# Patient Record
Sex: FEMALE | Race: WHITE | Employment: FULL TIME | URBAN - METROPOLITAN AREA
[De-identification: names, ages, dates, MRNs, and addresses within clinical notes are randomized per-mention and may not be internally consistent; named-entity substitution may affect disease eponyms.]

---

## 2017-01-01 ENCOUNTER — TELEPHONE (OUTPATIENT)
Dept: FAMILY MEDICINE CLINIC | Facility: CLINIC | Age: 47
End: 2017-01-01

## 2017-01-01 ENCOUNTER — APPOINTMENT (OUTPATIENT)
Dept: CT IMAGING | Facility: HOSPITAL | Age: 47
DRG: 055 | End: 2017-01-01
Attending: INTERNAL MEDICINE
Payer: MEDICAID

## 2017-01-01 ENCOUNTER — OFFICE VISIT (OUTPATIENT)
Dept: RADIATION ONCOLOGY | Facility: HOSPITAL | Age: 47
DRG: 055 | End: 2017-01-01
Attending: Other
Payer: MEDICAID

## 2017-01-01 ENCOUNTER — APPOINTMENT (OUTPATIENT)
Dept: MRI IMAGING | Facility: HOSPITAL | Age: 47
DRG: 055 | End: 2017-01-01
Attending: Other
Payer: MEDICAID

## 2017-01-01 ENCOUNTER — HOSPITAL ENCOUNTER (INPATIENT)
Facility: HOSPITAL | Age: 47
LOS: 5 days | Discharge: HOSPICE | DRG: 055 | End: 2017-01-01
Attending: EMERGENCY MEDICINE
Payer: MEDICAID

## 2017-01-01 ENCOUNTER — HOSPITAL ENCOUNTER (OUTPATIENT)
Dept: CT IMAGING | Age: 47
Discharge: HOME | DRG: 055 | End: 2017-01-01
Payer: MEDICAID

## 2017-01-01 ENCOUNTER — OFFICE VISIT (OUTPATIENT)
Dept: FAMILY MEDICINE CLINIC | Facility: CLINIC | Age: 47
End: 2017-01-01

## 2017-01-01 ENCOUNTER — APPOINTMENT (OUTPATIENT)
Dept: CT IMAGING | Facility: HOSPITAL | Age: 47
DRG: 055 | End: 2017-01-01
Attending: EMERGENCY MEDICINE
Payer: MEDICAID

## 2017-01-01 ENCOUNTER — HOSPITAL ENCOUNTER (EMERGENCY)
Facility: HOSPITAL | Age: 47
Discharge: HOME | End: 2017-01-01
Attending: EMERGENCY MEDICINE
Payer: MEDICAID

## 2017-01-01 ENCOUNTER — HOSPITAL ENCOUNTER (INPATIENT)
Facility: HOSPITAL | Age: 47
LOS: 3 days | DRG: 055 | End: 2017-01-01
Payer: OTHER MISCELLANEOUS

## 2017-01-01 VITALS
SYSTOLIC BLOOD PRESSURE: 114 MMHG | WEIGHT: 174 LBS | HEIGHT: 62.5 IN | OXYGEN SATURATION: 98 % | BODY MASS INDEX: 31.22 KG/M2 | HEART RATE: 73 BPM | DIASTOLIC BLOOD PRESSURE: 70 MMHG

## 2017-01-01 VITALS
SYSTOLIC BLOOD PRESSURE: 155 MMHG | HEART RATE: 77 BPM | OXYGEN SATURATION: 87 % | BODY MASS INDEX: 31.28 KG/M2 | DIASTOLIC BLOOD PRESSURE: 76 MMHG | HEIGHT: 62 IN | TEMPERATURE: 100 F | WEIGHT: 170 LBS | RESPIRATION RATE: 28 BRPM

## 2017-01-01 VITALS
BODY MASS INDEX: 30.91 KG/M2 | OXYGEN SATURATION: 99 % | HEART RATE: 85 BPM | TEMPERATURE: 98 F | HEIGHT: 62 IN | WEIGHT: 168 LBS | DIASTOLIC BLOOD PRESSURE: 84 MMHG | SYSTOLIC BLOOD PRESSURE: 150 MMHG | RESPIRATION RATE: 18 BRPM

## 2017-01-01 VITALS
RESPIRATION RATE: 28 BRPM | HEART RATE: 103 BPM | OXYGEN SATURATION: 72 % | SYSTOLIC BLOOD PRESSURE: 174 MMHG | DIASTOLIC BLOOD PRESSURE: 72 MMHG | TEMPERATURE: 102 F

## 2017-01-01 VITALS — DIASTOLIC BLOOD PRESSURE: 80 MMHG | HEART RATE: 70 BPM | OXYGEN SATURATION: 98 % | SYSTOLIC BLOOD PRESSURE: 120 MMHG

## 2017-01-01 DIAGNOSIS — D31.32: ICD-10-CM

## 2017-01-01 DIAGNOSIS — D31.32: Primary | ICD-10-CM

## 2017-01-01 DIAGNOSIS — C79.31 METASTATIC CANCER TO BRAIN (HCC): Primary | ICD-10-CM

## 2017-01-01 DIAGNOSIS — R42 DIZZINESS: Primary | ICD-10-CM

## 2017-01-01 DIAGNOSIS — J06.9 VIRAL UPPER RESPIRATORY TRACT INFECTION: Primary | ICD-10-CM

## 2017-01-01 DIAGNOSIS — M25.512 ACUTE PAIN OF LEFT SHOULDER: ICD-10-CM

## 2017-01-01 LAB
ANION GAP SERPL CALC-SCNC: 7 MMOL/L (ref 0–18)
BASOPHILS # BLD: 0 K/UL (ref 0–0.2)
BASOPHILS NFR BLD: 0 %
BILIRUB UR QL: NEGATIVE
BUN SERPL-MCNC: 5 MG/DL (ref 8–20)
BUN/CREAT SERPL: 6.4 (ref 10–20)
CALCIUM SERPL-MCNC: 9.4 MG/DL (ref 8.5–10.5)
CHLORIDE SERPL-SCNC: 107 MMOL/L (ref 95–110)
CLARITY UR: CLEAR
CO2 SERPL-SCNC: 24 MMOL/L (ref 22–32)
COLOR UR: YELLOW
CREAT SERPL-MCNC: 0.78 MG/DL (ref 0.5–1.5)
EOSINOPHIL # BLD: 0.1 K/UL (ref 0–0.7)
EOSINOPHIL NFR BLD: 1 %
ERYTHROCYTE [DISTWIDTH] IN BLOOD BY AUTOMATED COUNT: 14.9 % (ref 11–15)
GLUCOSE SERPL-MCNC: 139 MG/DL (ref 70–99)
GLUCOSE UR-MCNC: 150 MG/DL
HCT VFR BLD AUTO: 36.1 % (ref 35–48)
HGB BLD-MCNC: 11.6 G/DL (ref 12–16)
KETONES UR-MCNC: NEGATIVE MG/DL
LEUKOCYTE ESTERASE UR QL STRIP.AUTO: NEGATIVE
LYMPHOCYTES # BLD: 1.9 K/UL (ref 1–4)
LYMPHOCYTES NFR BLD: 25 %
MCH RBC QN AUTO: 24.1 PG (ref 27–32)
MCHC RBC AUTO-ENTMCNC: 32.2 G/DL (ref 32–37)
MCV RBC AUTO: 74.9 FL (ref 80–100)
MONOCYTES # BLD: 0.5 K/UL (ref 0–1)
MONOCYTES NFR BLD: 7 %
NEUTROPHILS # BLD AUTO: 5 K/UL (ref 1.8–7.7)
NEUTROPHILS NFR BLD: 67 %
NITRITE UR QL STRIP.AUTO: NEGATIVE
OSMOLALITY UR CALC.SUM OF ELEC: 286 MOSM/KG (ref 275–295)
PH UR: 5 [PH] (ref 5–8)
PLATELET # BLD AUTO: 258 K/UL (ref 140–400)
PMV BLD AUTO: 8.5 FL (ref 7.4–10.3)
POTASSIUM SERPL-SCNC: 3.8 MMOL/L (ref 3.3–5.1)
PROT UR-MCNC: NEGATIVE MG/DL
RBC # BLD AUTO: 4.82 M/UL (ref 3.7–5.4)
RBC #/AREA URNS AUTO: 58 /HPF
SODIUM SERPL-SCNC: 138 MMOL/L (ref 136–144)
SP GR UR STRIP: 1.01 (ref 1–1.03)
UROBILINOGEN UR STRIP-ACNC: <2
VIT C UR-MCNC: NEGATIVE MG/DL
WBC # BLD AUTO: 7.5 K/UL (ref 4–11)
WBC #/AREA URNS AUTO: 1 /HPF

## 2017-01-01 PROCEDURE — 70553 MRI BRAIN STEM W/O & W/DYE: CPT

## 2017-01-01 PROCEDURE — 99253 IP/OBS CNSLTJ NEW/EST LOW 45: CPT | Performed by: OTHER

## 2017-01-01 PROCEDURE — 99285 EMERGENCY DEPT VISIT HI MDM: CPT

## 2017-01-01 PROCEDURE — 70450 CT HEAD/BRAIN W/O DYE: CPT

## 2017-01-01 PROCEDURE — 99233 SBSQ HOSP IP/OBS HIGH 50: CPT | Performed by: INTERNAL MEDICINE

## 2017-01-01 PROCEDURE — 99253 IP/OBS CNSLTJ NEW/EST LOW 45: CPT | Performed by: INTERNAL MEDICINE

## 2017-01-01 PROCEDURE — 99231 SBSQ HOSP IP/OBS SF/LOW 25: CPT | Performed by: INTERNAL MEDICINE

## 2017-01-01 PROCEDURE — 99232 SBSQ HOSP IP/OBS MODERATE 35: CPT | Performed by: INTERNAL MEDICINE

## 2017-01-01 PROCEDURE — 99232 SBSQ HOSP IP/OBS MODERATE 35: CPT | Performed by: FAMILY MEDICINE

## 2017-01-01 PROCEDURE — 81001 URINALYSIS AUTO W/SCOPE: CPT | Performed by: EMERGENCY MEDICINE

## 2017-01-01 PROCEDURE — 0T9B70Z DRAINAGE OF BLADDER WITH DRAINAGE DEVICE, VIA NATURAL OR ARTIFICIAL OPENING: ICD-10-PCS

## 2017-01-01 PROCEDURE — 71260 CT THORAX DX C+: CPT

## 2017-01-01 PROCEDURE — 99213 OFFICE O/P EST LOW 20 MIN: CPT

## 2017-01-01 PROCEDURE — 93010 ELECTROCARDIOGRAM REPORT: CPT | Performed by: EMERGENCY MEDICINE

## 2017-01-01 PROCEDURE — 36415 COLL VENOUS BLD VENIPUNCTURE: CPT

## 2017-01-01 PROCEDURE — 85025 COMPLETE CBC W/AUTO DIFF WBC: CPT | Performed by: EMERGENCY MEDICINE

## 2017-01-01 PROCEDURE — 93005 ELECTROCARDIOGRAM TRACING: CPT

## 2017-01-01 PROCEDURE — 99232 SBSQ HOSP IP/OBS MODERATE 35: CPT

## 2017-01-01 PROCEDURE — 99252 IP/OBS CONSLTJ NEW/EST SF 35: CPT | Performed by: INTERNAL MEDICINE

## 2017-01-01 PROCEDURE — 74177 CT ABD & PELVIS W/CONTRAST: CPT

## 2017-01-01 PROCEDURE — 99212 OFFICE O/P EST SF 10 MIN: CPT

## 2017-01-01 PROCEDURE — 80048 BASIC METABOLIC PNL TOTAL CA: CPT | Performed by: EMERGENCY MEDICINE

## 2017-01-01 PROCEDURE — 99222 1ST HOSP IP/OBS MODERATE 55: CPT

## 2017-01-01 RX ORDER — TRAMADOL HYDROCHLORIDE 50 MG/1
50 TABLET ORAL EVERY 6 HOURS PRN
Status: CANCELLED | OUTPATIENT
Start: 2017-01-01

## 2017-01-01 RX ORDER — NAPROXEN 500 MG/1
500 TABLET ORAL 2 TIMES DAILY WITH MEALS
Qty: 60 TABLET | Refills: 0 | Status: ON HOLD | OUTPATIENT
Start: 2017-01-01 | End: 2017-01-01

## 2017-01-01 RX ORDER — NICOTINE 21 MG/24HR
1 PATCH, TRANSDERMAL 24 HOURS TRANSDERMAL DAILY
Status: DISCONTINUED | OUTPATIENT
Start: 2017-01-01 | End: 2017-01-01

## 2017-01-01 RX ORDER — ATROPINE SULFATE 10 MG/ML
2 SOLUTION/ DROPS OPHTHALMIC EVERY 2 HOUR PRN
Status: DISCONTINUED | OUTPATIENT
Start: 2017-01-01 | End: 2017-01-01

## 2017-01-01 RX ORDER — MORPHINE SULFATE 2 MG/ML
2 INJECTION, SOLUTION INTRAMUSCULAR; INTRAVENOUS EVERY 2 HOUR PRN
Status: DISCONTINUED | OUTPATIENT
Start: 2017-01-01 | End: 2017-01-01

## 2017-01-01 RX ORDER — PROMETHAZINE HYDROCHLORIDE AND CODEINE PHOSPHATE 6.25; 1 MG/5ML; MG/5ML
5 SYRUP ORAL EVERY 6 HOURS PRN
Qty: 240 ML | Refills: 0 | Status: SHIPPED | OUTPATIENT
Start: 2017-01-01 | End: 2017-01-01

## 2017-01-01 RX ORDER — MORPHINE SULFATE 4 MG/ML
2 INJECTION, SOLUTION INTRAMUSCULAR; INTRAVENOUS EVERY 2 HOUR PRN
Status: DISCONTINUED | OUTPATIENT
Start: 2017-01-01 | End: 2017-01-01

## 2017-01-01 RX ORDER — DEXTROSE AND SODIUM CHLORIDE 5; .45 G/100ML; G/100ML
INJECTION, SOLUTION INTRAVENOUS CONTINUOUS
Status: DISCONTINUED | OUTPATIENT
Start: 2017-01-01 | End: 2017-01-01

## 2017-01-01 RX ORDER — 0.9 % SODIUM CHLORIDE 0.9 %
VIAL (ML) INJECTION
Status: COMPLETED
Start: 2017-01-01 | End: 2017-01-01

## 2017-01-01 RX ORDER — SODIUM CHLORIDE 9 MG/ML
INJECTION, SOLUTION INTRAVENOUS
Status: COMPLETED
Start: 2017-01-01 | End: 2017-01-01

## 2017-01-01 RX ORDER — TRAMADOL HYDROCHLORIDE 50 MG/1
50 TABLET ORAL EVERY 6 HOURS PRN
Status: DISCONTINUED | OUTPATIENT
Start: 2017-01-01 | End: 2017-01-01

## 2017-01-01 RX ORDER — DEXAMETHASONE SODIUM PHOSPHATE 4 MG/ML
4 VIAL (ML) INJECTION EVERY 6 HOURS
Status: DISCONTINUED | OUTPATIENT
Start: 2017-01-01 | End: 2017-01-01

## 2017-01-01 RX ORDER — SODIUM CHLORIDE 9 MG/ML
INJECTION, SOLUTION INTRAVENOUS
Status: DISPENSED
Start: 2017-01-01 | End: 2017-01-01

## 2017-01-01 RX ORDER — SODIUM CHLORIDE 0.9 % (FLUSH) 0.9 %
10 SYRINGE (ML) INJECTION AS NEEDED
Status: DISCONTINUED | OUTPATIENT
Start: 2017-01-01 | End: 2017-01-01

## 2017-01-01 RX ORDER — GLYCOPYRROLATE 0.2 MG/ML
0.4 INJECTION, SOLUTION INTRAMUSCULAR; INTRAVENOUS
Status: DISCONTINUED | OUTPATIENT
Start: 2017-01-01 | End: 2017-01-01

## 2017-01-01 RX ORDER — SENNA AND DOCUSATE SODIUM 50; 8.6 MG/1; MG/1
2 TABLET, FILM COATED ORAL DAILY
Status: CANCELLED | OUTPATIENT
Start: 2017-01-01

## 2017-01-01 RX ORDER — BISACODYL 10 MG
10 SUPPOSITORY, RECTAL RECTAL
Status: DISCONTINUED | OUTPATIENT
Start: 2017-01-01 | End: 2017-01-01

## 2017-01-01 RX ORDER — DEXAMETHASONE SODIUM PHOSPHATE 4 MG/ML
4 VIAL (ML) INJECTION EVERY 4 HOURS
Status: DISCONTINUED | OUTPATIENT
Start: 2017-01-01 | End: 2017-01-01

## 2017-01-01 RX ORDER — TRAMADOL HYDROCHLORIDE 50 MG/1
50 TABLET ORAL ONCE
Status: COMPLETED | OUTPATIENT
Start: 2017-01-01 | End: 2017-01-01

## 2017-01-01 RX ORDER — MORPHINE SULFATE IN 0.9 % NACL 1 MG/ML
1 PLASTIC BAG, INJECTION (ML) INTRAVENOUS CONTINUOUS
Status: CANCELLED | OUTPATIENT
Start: 2017-01-01

## 2017-01-01 RX ORDER — LORAZEPAM 2 MG/ML
2 INJECTION INTRAMUSCULAR EVERY 4 HOURS PRN
Status: DISCONTINUED | OUTPATIENT
Start: 2017-01-01 | End: 2017-01-01

## 2017-01-01 RX ORDER — MORPHINE SULFATE 2 MG/ML
1 INJECTION, SOLUTION INTRAMUSCULAR; INTRAVENOUS
Status: DISCONTINUED | OUTPATIENT
Start: 2017-01-01 | End: 2017-01-01

## 2017-01-01 RX ORDER — METHYLPREDNISOLONE 4 MG/1
TABLET ORAL
Qty: 1 KIT | Refills: 0 | Status: SHIPPED | OUTPATIENT
Start: 2017-01-01 | End: 2017-01-01 | Stop reason: ALTCHOICE

## 2017-01-01 RX ORDER — CODEINE PHOSPHATE AND GUAIFENESIN 10; 100 MG/5ML; MG/5ML
5 SOLUTION ORAL EVERY 6 HOURS PRN
Qty: 240 ML | Refills: 0 | Status: SHIPPED | OUTPATIENT
Start: 2017-01-01 | End: 2017-01-01 | Stop reason: ALTCHOICE

## 2017-01-01 RX ORDER — HYOSCYAMINE SULFATE 0.125 MG
0.12 TABLET,DISINTEGRATING ORAL 4 TIMES DAILY PRN
Status: DISCONTINUED | OUTPATIENT
Start: 2017-01-01 | End: 2017-01-01

## 2017-01-01 RX ORDER — MORPHINE SULFATE 2 MG/ML
INJECTION, SOLUTION INTRAMUSCULAR; INTRAVENOUS
Status: COMPLETED
Start: 2017-01-01 | End: 2017-01-01

## 2017-01-01 RX ORDER — MORPHINE SULFATE IN 0.9 % NACL 1 MG/ML
1 PLASTIC BAG, INJECTION (ML) INTRAVENOUS CONTINUOUS
Status: DISCONTINUED | OUTPATIENT
Start: 2017-01-01 | End: 2017-01-01

## 2017-01-01 RX ORDER — DEXTROSE AND SODIUM CHLORIDE 5; .45 G/100ML; G/100ML
INJECTION, SOLUTION INTRAVENOUS CONTINUOUS
Status: CANCELLED | OUTPATIENT
Start: 2017-01-01

## 2017-01-01 RX ORDER — HALOPERIDOL 5 MG/ML
1 INJECTION INTRAMUSCULAR
Status: DISCONTINUED | OUTPATIENT
Start: 2017-01-01 | End: 2017-01-01

## 2017-01-01 RX ORDER — DEXAMETHASONE SODIUM PHOSPHATE 4 MG/ML
4 VIAL (ML) INJECTION EVERY 12 HOURS SCHEDULED
Status: DISCONTINUED | OUTPATIENT
Start: 2017-01-01 | End: 2017-01-01

## 2017-01-01 RX ORDER — SCOLOPAMINE TRANSDERMAL SYSTEM 1 MG/1
1 PATCH, EXTENDED RELEASE TRANSDERMAL
Status: DISCONTINUED | OUTPATIENT
Start: 2017-01-01 | End: 2017-01-01

## 2017-01-01 RX ORDER — LORAZEPAM 2 MG/ML
0.5 INJECTION INTRAMUSCULAR EVERY 4 HOURS PRN
Status: DISCONTINUED | OUTPATIENT
Start: 2017-01-01 | End: 2017-01-01

## 2017-01-01 RX ORDER — DEXAMETHASONE SODIUM PHOSPHATE 4 MG/ML
10 VIAL (ML) INJECTION ONCE
Status: COMPLETED | OUTPATIENT
Start: 2017-01-01 | End: 2017-01-01

## 2017-01-01 RX ORDER — ONDANSETRON 4 MG/1
4 TABLET, ORALLY DISINTEGRATING ORAL EVERY 6 HOURS PRN
Status: DISCONTINUED | OUTPATIENT
Start: 2017-01-01 | End: 2017-01-01

## 2017-01-01 RX ORDER — SENNA AND DOCUSATE SODIUM 50; 8.6 MG/1; MG/1
2 TABLET, FILM COATED ORAL DAILY
Status: DISCONTINUED | OUTPATIENT
Start: 2017-01-01 | End: 2017-01-01

## 2017-01-01 RX ORDER — POLYVINYL ALCOHOL 14 MG/ML
2 SOLUTION/ DROPS OPHTHALMIC
Status: DISCONTINUED | OUTPATIENT
Start: 2017-01-01 | End: 2017-01-01

## 2017-01-01 RX ORDER — 0.9 % SODIUM CHLORIDE 0.9 %
VIAL (ML) INJECTION
Status: DISPENSED
Start: 2017-01-01 | End: 2017-01-01

## 2017-01-01 RX ORDER — ONDANSETRON 2 MG/ML
4 INJECTION INTRAMUSCULAR; INTRAVENOUS EVERY 6 HOURS PRN
Status: DISCONTINUED | OUTPATIENT
Start: 2017-01-01 | End: 2017-01-01

## 2017-01-01 RX ORDER — SODIUM CHLORIDE 9 MG/ML
INJECTION, SOLUTION INTRAVENOUS CONTINUOUS
Status: DISCONTINUED | OUTPATIENT
Start: 2017-01-01 | End: 2017-01-01

## 2017-01-01 RX ORDER — MORPHINE SULFATE 4 MG/ML
4 INJECTION, SOLUTION INTRAMUSCULAR; INTRAVENOUS ONCE
Status: COMPLETED | OUTPATIENT
Start: 2017-01-01 | End: 2017-01-01

## 2017-01-01 RX ORDER — DEXAMETHASONE SODIUM PHOSPHATE 4 MG/ML
4 VIAL (ML) INJECTION EVERY 4 HOURS
Status: CANCELLED | OUTPATIENT
Start: 2017-01-01

## 2017-01-01 RX ORDER — MECLIZINE HYDROCHLORIDE 25 MG/1
25 TABLET ORAL 3 TIMES DAILY PRN
Qty: 15 TABLET | Refills: 0 | Status: ON HOLD | OUTPATIENT
Start: 2017-01-01 | End: 2017-01-01

## 2017-01-01 RX ORDER — NICOTINE 21 MG/24HR
1 PATCH, TRANSDERMAL 24 HOURS TRANSDERMAL DAILY
Status: CANCELLED | OUTPATIENT
Start: 2017-01-01

## 2017-01-01 RX ORDER — MORPHINE SULFATE 2 MG/ML
2 INJECTION, SOLUTION INTRAMUSCULAR; INTRAVENOUS EVERY 2 HOUR PRN
Status: CANCELLED | OUTPATIENT
Start: 2017-01-01

## 2017-01-01 RX ORDER — LORAZEPAM 2 MG/ML
1 INJECTION INTRAMUSCULAR EVERY 4 HOURS PRN
Status: DISCONTINUED | OUTPATIENT
Start: 2017-01-01 | End: 2017-01-01

## 2017-01-01 RX ORDER — HALOPERIDOL 5 MG/ML
2 INJECTION INTRAMUSCULAR
Status: DISCONTINUED | OUTPATIENT
Start: 2017-01-01 | End: 2017-01-01

## 2017-02-21 PROBLEM — J06.9 VIRAL UPPER RESPIRATORY TRACT INFECTION: Status: ACTIVE | Noted: 2017-01-01

## 2017-02-21 PROBLEM — M25.512 ACUTE PAIN OF LEFT SHOULDER: Status: ACTIVE | Noted: 2017-01-01

## 2017-02-21 NOTE — PROGRESS NOTES
HPI:    Patient ID: Darryn Giudo is a 42year old female. Cough  This is a new problem. Episode onset: few weeks ago. The problem has been unchanged. The cough is productive of sputum.  Associated symptoms include chest pain, nasal congestion, rhinorrhe tingling and numbness. Negative for dizziness, syncope, light-headedness and headaches. All other systems reviewed and are negative. Current Outpatient Prescriptions:  methylPREDNISolone (MEDROL) 4 MG Oral Tablet Therapy Pack As directed.  Dis relief. Rx for Guaifenesin/Codeine given. 2. Acute pain of left shoulder  Clinical course, prognosis, and treatment options of disease process discussed with pt. Rx for Medrol dose pack and Naproxen given. RTC if symptoms worsen or fail to improve.

## 2017-02-22 NOTE — PATIENT INSTRUCTIONS
Shoulder Pain with Uncertain Cause  Shoulder pain can have many causes. Pain often comes from the structures that surround the shoulder joint. These are the joint capsule, ligaments, tendons, muscles, and bursa.  Pain can also come from cartilage in the j · Shoulder pain may seem worse at night, when there is less to distract you from the pain. If you sleep on your side, try to keep weight off your painful shoulder.  Propping pillows behind you may stop you from rolling over onto that shoulder during sleep.

## 2017-03-14 PROBLEM — J06.9 VIRAL UPPER RESPIRATORY TRACT INFECTION: Status: RESOLVED | Noted: 2017-01-01 | Resolved: 2017-01-01

## 2017-03-14 PROBLEM — M25.512 ACUTE PAIN OF LEFT SHOULDER: Status: RESOLVED | Noted: 2017-01-01 | Resolved: 2017-01-01

## 2017-03-14 PROBLEM — E78.5 HYPERLIPIDEMIA: Status: ACTIVE | Noted: 2017-01-01

## 2017-03-14 PROBLEM — D31.30: Status: ACTIVE | Noted: 2017-01-01

## 2017-03-15 NOTE — PROGRESS NOTES
HPI:    Patient ID: Loretta Pal is a 42year old female. Cancer  This is a new (on the L eye) problem. Episode onset: few weeks ago. The problem occurs constantly. The problem has been unchanged. Associated symptoms include a visual change.  Pertinent exhibits no discharge. Left eye exhibits no discharge. Neurological: She is alert and oriented to person, place, and time. Skin: Skin is warm. No rash noted. Nursing note and vitals reviewed. ASSESSMENT/PLAN:   1.  Choroidal tumor, benign,

## 2017-03-22 NOTE — ED INITIAL ASSESSMENT (HPI)
Pt c/o dizziness, ams, blurred vision. Pt was dx with cancerous tumor behind right eye two weeks ago. Pt has follow up this week to find wource of tumor. Pt family here with pt stating that pt has become very off balance and has slurred speech.

## 2017-03-22 NOTE — ED PROVIDER NOTES
Patient Seen in: [facility] Emergency Department    History   Patient presents with:  Dizziness (neurologic)  Fall (musculoskeletal, neurologic)    Stated Complaint: dizziness, falling    HPI    Patient is a 53-year-old female who states that she falling  Other systems are as noted in HPI. Constitutional and vital signs reviewed. All other systems reviewed and negative except as noted above. PSFH elements reviewed from today and agreed except as otherwise stated in HPI.     Physical Exam limits   CBC WITH DIFFERENTIAL WITH PLATELET    Narrative: The following orders were created for panel order CBC WITH DIFFERENTIAL WITH PLATELET.   Procedure                               Abnormality         Status                     ---------

## 2017-03-23 PROBLEM — C79.31 METASTATIC CANCER TO BRAIN (HCC): Status: ACTIVE | Noted: 2017-01-01

## 2017-03-23 NOTE — TELEPHONE ENCOUNTER
Per Anjelica Ordonezherson is worst than yesterday not with the medicine she got form the ER she is better. Per Dr. Heike Garcia recommendation bring her back to the ER.

## 2017-03-23 NOTE — ED INITIAL ASSESSMENT (HPI)
Seen last night for same s/s. Pt has increased weakness and per family states that her weakness has increased since yest. Pt reports pain to legs.  Pt aslo has a chest ct at [place] today

## 2017-03-24 PROBLEM — Z72.0 TOBACCO USER: Status: ACTIVE | Noted: 2017-01-01

## 2017-03-24 PROBLEM — K59.00 CONSTIPATION: Status: ACTIVE | Noted: 2017-01-01

## 2017-03-24 NOTE — ED PROVIDER NOTES
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  Pt arrives in E stated complaint: weakness   Other systems are as noted in HPI. Constitutional and vital signs reviewed. All other systems reviewed and negative except as noted above. PSFH elements reviewed from today and agreed except as otherwise stated in HPI. Please view results for these tests on the individual orders. URINALYSIS WITH CULTURE REFLEX   TYPE AND SCREEN    Narrative: The following orders were created for panel order TYPE AND SCREEN.   Procedure                               Abnorm recommended. MRI brain with without contrast is recommended.        I spent a total of 30 minutes of critical care time in obtaining history, performing a physical exam, bedside monitoring of interventions, collecting and interpreting tests and discussion

## 2017-03-24 NOTE — H&P
[address] [facility] Patient Status:  Inpatient    1970 MRN [id number]   Location [facility] 4W/SW/SE Attending Veronica Rossi MD   Hosp Day # 1 PCP Addi Dela Cruz MD     Date:  3/24/2017  Jak following week. Currently she is resting in bed and mentions that she feels a little bit better, but c/o pressure behind her L eye and constipation for the past 3-4 days.  She denies any fever, chills, HA, cough, CP, SOB, abdominal pain, N/V/D, dysuria, hem SpO2 98 %, not currently breastfeeding. General appearance:  alert, appears stated age, cooperative and mild distress  Head: Normocephalic, without obvious abnormality, atraumatic  Eyes: conjunctivae/corneas clear. PERRL, EOM's intact. Fundi benign. , L used.   FINDINGS:  CARDIAC: The heart is not enlarged. No pericardial effusion. MEDIASTINUM/ABEL: There is a mass in the subcarinal region, displacing the esophagus measuring 27 x 32 mm.   There is a mass in the precarinal region extending into the right s Ct Brain Or Head (64396)    3/23/2017  This report includes an Addendum and supersedes previous reports for this exam.    PROCEDURE: CT BRAIN OR HEAD (CPT=70450)  COMPARISON: [facility], CT CHEST(CONTRAST ONLY) (CPT=712 is within the emily but also extends into the right cerebral peduncle/thalamus. CALVARIUM: No apparent fracture, mass, or other significant visible lesion. SINUSES: Limited views demonstrate no significant mucosal thickening or fluid.   ORBITS: See addendum to mass effect caused by a lesion in the left frontal lobe. The ventricles are otherwise normal in size and configuration.  CEREBRUM: In the medial aspect of the left frontal lobe is a 19 x 16 x 14 mm (AP x trans x craniocaudal) soft tissue focus with a trinh foci favors metastasis, however correlate for malignancy history. These 2 foci have edema surrounding them, and the edema within the emily/midbrain extends into the right cerebral peduncle and thalamus.   The focus in the emily/mid brain also demonstrates pe

## 2017-03-24 NOTE — CONSULTS
[facility]    Report of Hematology/Oncology Consultation    Enolia Alt Patient Status:  Inpatient    1970 MRN [id number]   Location 437/437-A Attending Low Loya MD   Date of admission 3/23/2017  7:07 PM   PCP Fiona Marc She states that over the summer, she had an episode of CP and dizziness, went to [facility] and told she had a heart attack. She states that she also has been loosing weight w/o trying since the summer. She had been otherwise doing well until 1 month ago. Per patient and family, she has declined very fast in the past 3 days. Has gone from being independent and working to [employer].   The patient's sister states that the patient told her 3 days ago that if she went to the hospital, she knew she would not le Smoking status: Current Every Day Smoker  0.33 Packs/Day  For 30.00 Years     Types: Cigarettes    Smokeless tobacco: Never Used    Alcohol Use: No    Drug Use: No    Sexual Activity: Yes     Other Topics Concern   None on file     Social History Maximo Bravo Nose: Nares normal. Septum midline. Mucosa normal. No drainage or sinus tenderness.   Throat: lips, mucosa, and tongue normal; teeth and gums normal  Neck: no adenopathy and supple, symmetrical, trachea midline  Lungs: clear to auscultation bilaterally and Result Value Ref Range   Hold Gold Auto Resulted    -RAINBOW DRAW LAVENDER   Collection Time: 03/23/17  7:51 PM   Result Value Ref Range   Hold Lavender Auto Resulted    -RAINBOW DRAW LIGHT GREEN   Collection Time: 03/23/17  7:51 PM   Result Value Ref Hanny Gracia -DIRECT WANDA C3   Collection Time: 03/23/17  8:29 PM   Result Value Ref Range   Jak (C3D) Positive    -DIRECT WANDA IGG   Collection Time: 03/23/17  8:29 PM   Result Value Ref Range   Jak (Igg) Positive    -ED/MRSA SCREEN BY PCR-CC   Collection Time: 03 3/23/2017  This report includes an Addendum and supersedes previous reports for this exam.    PROCEDURE: CT BRAIN OR HEAD (CPT=70450)  COMPARISON: [place], [place] CHEST(CONTRAST ONLY) (CPT=71260), 3/23/2017, 12:51.   INDICATI extends into the right cerebral peduncle/thalamus. CALVARIUM: No apparent fracture, mass, or other significant visible lesion. SINUSES: Limited views demonstrate no significant mucosal thickening or fluid. ORBITS: See addendum below.   There is a 4 x 12 m 3/23/2017  CONCLUSION:  1. There are lesions within the left frontal lobe and in the emily/midbrain, suspicious for neoplasm. Multiplicity of foci favors metastasis, however correlate for malignancy history.   These 2 foci have edema surrounding them, and Dr. Estrella Butler evaluated for RT to the brain as she is not a surgical candidate. She would not be able to go back and forth for RT due to her weakened state and if would like to pursue this option, would have to go to a facility.     Discussed therapy options such

## 2017-03-24 NOTE — PAYOR COMM NOTE
Review Type: ADMISSION   Reviewer: Corey Jolley       Date: March 24, 2017 - 3:36 PM  Payor: [address] [place] [address]  Authorization Number: [id number]  Admit date: 3/23/2017  7:07 PM   Admitted from Emergency Dept.:   Patient Hypertension Father            ED Triage Vitals   BP 03/23/17 1815 155/74 mmHg   Pulse 03/23/17 1815 79   Resp 03/23/17 1815 18   Temp 03/23/17 1815 97.6 °F (36.4 °C)   Temp src 03/23/17 1815 Oral   SpO2 03/23/17 1815 100 %   O2 Device 03/23/17 1815 None ( that patient be placed on oncology floor.     Disposition and Plan     Clinical Impression:  Metastatic cancer to brain [facility])  (primary encounter diagnosis)    Disposition:  Admit        Present on Admission  Date Reviewed: 3/14/2017          ICD-10-CM Note Name:                        [facility] Number of children:               Social History Main Topics    Smoking Status: Current Every Day Smoker        Packs/Day: 0.33  Years: 28         Types: Cigarettes    Smokeless Status: Never frontal lobe in close proximity with the left orbit roof is suspicious for metastatic disease.      3. A lesion occupying the medial aspect of the left globe is suspicious for neoplastic process. Consider melanoma.  Correlate with neuroophthalmological exam

## 2017-03-24 NOTE — PLAN OF CARE
SAFETY ADULT - FALL    • Free from fall injury Not Progressing          MUSCULOSKELETAL - ADULT    • Return mobility to safest level of function Progressing        NEUROLOGICAL - ADULT    • Achieves stable or improved neurological status Progressing

## 2017-03-24 NOTE — PROGRESS NOTES
Neurology Inpatient Consult Note    Joya Carr : 1970   Referring Physician: Dr. Stephen Russell   HPI:     Joya Carr is a 42year old female who is being seen in neurologic evaluation.     Patient is being seen in evaluation for metastatic rashes  EYES: See HPI  RESPIRATORY: no shortness of breath, no wheezing  CARDIOVASCULAR: no chest pain, no palpitations  GI: See HPI  GENITAL/: no dysuria, no frequency  MUSCULOSKELETAL: See HPI  PSYCH: no depression, no anxiety  NEURO: see HPI    EXAM: hemorrhagic foci. LABS: reviewed     ASSESSMENT AND PLAN:   Metastatic melanoma  Multiple intracranial metastases. Neurologic exam remarkably preserved.     –Continue Decadron 4 mg IV every 6 hours    –No indication for empiric antiepileptics at this

## 2017-03-25 NOTE — PROGRESS NOTES
RADIATION ONCOLOGY NOTE    DATE OF VISIT: 3/24/2017  DIAGNOSIS :   History of melanoma, with symptomatic brain metastasis, currently on decadron.      Dear Pelon Butterfield,    As you are aware the patient is a unfortunate  42year old female with history of repo Diabetes in her father; Hypertension in her father; Ovarian Cancer (age of onset: 36) in her paternal cousin female; Prostate Cancer (age of onset: 48) in her maternal cousin male; multiple myeloma in her father.     PHYSICAL EXAM  Last menstrual period 03/ BLUE   Collection Time: 03/23/17  7:51 PM   Result Value Ref Range   Hold Blue Auto Resulted    -RAINBOW DRAW GOLD   Collection Time: 03/23/17  7:51 PM   Result Value Ref Range   Hold Gold Auto Resulted    -RAINBOW DRAW LAVENDER   Collection Time: 03/23/17 Value Ref Range   Little (c) Antigen Positive    -WANDA, DIRECT   Collection Time: 03/23/17  8:29 PM   Result Value Ref Range   Jak (POLY) Positive    -DIRECT WANDA C3   Collection Time: 03/23/17  8:29 PM   Result Value Ref Range   Jak (C3D) Positive R foot melanoma s/p surgery and incomplete adjuvant tx. She now presents with progressive weakness and dizziness and was eventually found to have multiple brain metastasis including thalamic metastasis with edema and hemorrhage.       Her MRI and CTs were granuloma in the left apex. AORTA/VASCULAR:      Main pulmonary artery is not enlarged. No acute pulmonary embolus to the segmental pulmonary artery level. There is a left-sided three-vessel aortic arch without aneurysm or aortic injury.   Right upper loba [address] [place] [address], CT CHEST(CONTRAST ONLY) (CPT=71260), 3/23/2017, 12:51. INDICATIONS:   Patient presents with increased weakness.       TECHNIQUE:     CT images were obtained without contrast material.  Automated exposure control for dose redu visible lesion. SINUSES:         Limited views demonstrate no significant mucosal thickening or fluid. ORBITS:           See addendum below. There is a 4 x 12 mm soft tissue focus along the medial aspect of the left globe posterior chamber. 12:51.      INDICATIONS:   Patient presents with increased weakness. TECHNIQUE:     CT images were obtained without contrast material.  Automated exposure control for dose reduction was used.          FINDINGS:          CSF SPACES:  There is posterior mucosal thickening or fluid. ORBITS:           See addendum below. There is a 4 x 12 mm soft tissue focus along the medial aspect of the left globe posterior chamber. OTHER:            Negative. CONCLUSION:    1.   There are lesions wit contrast.         FINDINGS:          COMMENT:       Overall examination quality is compromised by patient motion artifact. Several sequences were repeated, and fast acquisition sequences were utilized where feasible.   CSF SPACES:  The ventricles, cisterns, measuring 2.0 x 2.4 x 2.9 cm (series 23, image 43; series 24, image 13; series 25, image 14) demonstrating peripheral enhancement. Layering intralesional fluid is appreciated, likely    representing internal hemorrhage.               Extensive edema is pres typographical error in the ORBITS section has been corrected to indicate that the crescentic lesion involves the medial aspect of the flow.  Additionally, the lesion exhibits mild intrinsic T1 hyperintensity, suggesting melanin content as can be seen in   t diverticulosis. There is no free air, free fluid, or lymphadenopathy in the    abdomen or pelvis. ABDOMINAL WALL:      The right lateral of the, superficial to 10th rib is a 6 mm diameter nodular focus in the subcutaneous fat (series 2, image 30).  There i

## 2017-03-25 NOTE — DISCHARGE PLANNING
TRINIDAD received order stating \"Patient needs information on getting  for custody of children\". TRINDIAD informed RN that SW cannot provide this type of information to pt.  TRINIDAD encouraged for pt and/or family to use the Internet as a form of resources to

## 2017-03-25 NOTE — PLAN OF CARE
MUSCULOSKELETAL - ADULT    • Return mobility to safest level of function Not Progressing        NEUROLOGICAL - ADULT    • Achieves stable or improved neurological status Not Progressing        PAIN - ADULT    • Verbalizes/displays adequate comfort level or

## 2017-03-25 NOTE — PROGRESS NOTES
[facility]    Progress Note    Juve Schuler Patient Status:  Inpatient    1970 MRN [id number]   Location [facility] 4W/SW/SE Attending Clarisa Gil MD   Hosp Day # 2 PCP Korin Toledo MD       Subjective:   Wan Liao COMPARISON: [place], [place] CHEST(CONTRAST ONLY) (CPT=71260), 3/23/2017, 12:51. INDICATIONS: Patient presents with increased weakness.   TECHNIQUE: CT images were obtained without contrast material.  Automated exposure contr SINUSES: Limited views demonstrate no significant mucosal thickening or fluid. ORBITS: See addendum below. There is a 4 x 12 mm soft tissue focus along the medial aspect of the left globe posterior chamber. OTHER: Negative. CONCLUSION:  1.   There are SPACES: There is posterior displacement of the anterior horn of the left lateral ventricle due to mass effect caused by a lesion in the left frontal lobe. The ventricles are otherwise normal in size and configuration.  CEREBRUM: In the medial aspect of the emily/midbrain, suspicious for neoplasm. Multiplicity of foci favors metastasis, however correlate for malignancy history.   These 2 foci have edema surrounding them, and the edema within the emily/midbrain extends into the right cerebral peduncle and thalam CT BRAIN OR HEAD (CPT=70450), 3/23/2017, 18:35. INDICATIONS: Intracranial mass lesions. TECHNIQUE: A variety of imaging planes and parameters were utilized for visualization of suspected pathology.   Images were performed without and with gadolinium contr CEREBELLUM: No edema, hemorrhage, mass, acute infarction, or significant atrophy.   BRAINSTEM: There is an ovoid lesion involving the emily and midbrain measuring 2.0 x 2.4 x 2.9 cm (series 23, image 43; series 24, image 13; series 25, image 14) demonstratin 3/24/2017 at 13:00     ADDENDUM: A typographical error in the ORBITS section has been corrected to indicate that the crescentic lesion involves the medial aspect of the flow.  Additionally, the lesion exhibits mild intrinsic T1 hyperintensity, suggesting me

## 2017-03-25 NOTE — SLP NOTE
ADULT SWALLOWING EVALUATION    ASSESSMENT & PLAN   ASSESSMENT  Pt seen sitting upright in bed for all PO trials. Pt verbalized globus sensation in lower pharyngeal region/esophageal sphincter region prior to PO trials.  She verbalized that \"food sometimes Limits    Voice Quality: Clear  Respiratory Status: Nasal cannula  Consistencies Trialed:  Thin liquids;Puree;Hard solid  Method of Presentation: Self presentation;Spoon;Straw;Cup  Patient Positioning: Upright    Oral Phase of Swallow: Impaired  Bolus Retri 2  Follow Up Needed: Yes  SLP Follow-up Date: 03/26/17    Thank you for your referral.   Samson García MA, [address] Pathologist  [place] [phone]    If you have any questions, please contact Samson García

## 2017-03-25 NOTE — PLAN OF CARE
DISCHARGE PLANNING    • Discharge to home or other facility with appropriate resources Not Progressing        MUSCULOSKELETAL - ADULT    • Return mobility to safest level of function Not Progressing        NEUROLOGICAL - ADULT    • Achieves stable or impro

## 2017-03-25 NOTE — PLAN OF CARE
MUSCULOSKELETAL - ADULT    • Return mobility to safest level of function Not Progressing        Patient/Family Goals    • Patient/Family Long Term Goal Not Progressing    • Patient/Family Short Term Goal Not Progressing          NEUROLOGICAL - ADULT    • A

## 2017-03-26 NOTE — PROGRESS NOTES
[facility]    Hematology/Oncology   Progress Note    Juan Malloy Patient Status:  Inpatient    1970 MRN [id number]   Location [facility] 4W/SW/SE Attending Mandy Wilson MD   [address] Day # 2 PCP Alonzo Wade MD     [facility] malignancy/metastasis.       Ct Brain Or Head (85474)    3/25/2017  This report includes an Addendum and supersedes previous reports for this exam.   This report includes an Addendum and supersedes previous reports for this exam.    PROCEDURE: CT BRAIN OR H this finding possibly representing hemorrhage or calcifications. The edema associated with this finding is within the emily but also extends into the right cerebral peduncle/thalamus.  CALVARIUM: No apparent fracture, mass, or other significant visible lesi for [facility], CT CHEST(CONTRAST ONLY) (CPT=71260), 3/23/2017, 12:51. INDICATIONS: Patient presents with increased weakness. TECHNIQUE: CT images were obtained without contrast material.  Automated exposure control for dose reduction was used.    FINDINGS: thickening or fluid. ORBITS: See addendum below. There is a 4 x 12 mm soft tissue focus along the medial aspect of the left globe posterior chamber. OTHER: Negative. CONCLUSION:  1.   There are lesions within the left frontal lobe and in the emily/midb without contrast is recommended.        Mri Brain (w+wo) (qds=91303)    3/25/2017  This report includes an Addendum and supersedes previous reports for this exam.    PROCEDURE: MRI BRAIN (W+WO) (CPT=70553)  COMPARISON: [facility], [address] [place] [address] image 11) may represent additional hemorrhagic lesions. The former may correspond to the aforementioned site of subtle enhancement and hyperdensity on CT. There is no vascular-territory diffusion restriction to suggest acute or subacute ischemia.  Berta Lopez aspect of the right occipital lobe may represent metastatic foci. 5. Potential hemorrhagic foci. 6. Lesser incidental findings as above.     Dictated by (CST): Barbara Camilo MD on 3/24/2017 at 12:42     Approved by (CST): Barbara Camilo MD on 3/24/2017 [place]. 4.  Urinary bladder is collapsed around a Goel catheter. 5.  Trace left basal pleural effusion. Medications reviewed.     Assessment and Plan:  42 year with hx of stage II right foot melanoma now with brain mets, mediastinal, axillary an

## 2017-03-26 NOTE — PLAN OF CARE
DISCHARGE PLANNING    • Discharge to home or other facility with appropriate resources Not Progressing        MUSCULOSKELETAL - ADULT    • Return mobility to safest level of function Not Progressing        Patient/Family Goals    • Patient/Family Long Term

## 2017-03-27 NOTE — CONSULTS
[facility]:  [facility]    1970 MRN [id number]   Location [facility] 4W/SW/SE Attending Clarisa Gil MD   Hosp Day # 4 PCP Korin Toledo MD     Date of Co however she came here to help Aden Elkins when she became acutely ill. Thee North told me that Aden Elkins was up and animated and energetic this past weekend. However today she is sleeping and hardly waking up.  Thee North just recently buried her brother and has gone thro Status: cigarette smoker  Hx of Substance Use/Abuse: yes    Allergies:  No Known Allergies    Medications:     Current facility-administered medications:   •  Sodium Chloride 0.9 % solution, , ,   •  dexamethasone Sodium Phosphate (DECADRON) 4 MG/ML inject Non-distended  Extremities: Without clubbing, cyanosis or edema. Skin: Warm and dry.     Palliative Performance Scale :  40%    Palliative Care Goals of Care:  Patient/Family: both  Patient's preference about sharing medical information: okay to share wi biopsy is scheduled for 2 PM today however she is unable to give consent  Her family is in her room and her sister Elen Sorensen is as well  They all gave consent for me to try nail bed pressure and sternal rub to wake her up  She barely opened her eyes briefly b

## 2017-03-27 NOTE — PROGRESS NOTES
[facility]    Progress Note    Emy Soto Patient Status:  Inpatient    1970 MRN [id number]   Location [facility] 4W/SW/SE Attending Nemo Felton MD   Hosp Day # 4 PCP Braulio Stoddard MD       Subjective:   Maggy Daugherty ALB 3.4* 03/26/2017   ALKPHO 80 03/26/2017   BILT 0.4 03/26/2017   TP 6.8 03/26/2017   AST 18 03/26/2017   ALT 18 03/26/2017   PTT 30.9 03/23/2017   INR 1.0 03/23/2017   TROP 0.03 03/23/2017                 Assessment and Plan:     Metastatic melanoma (H

## 2017-03-27 NOTE — PROGRESS NOTES
[facility]    Hematology/Oncology   Progress Note    Juve Schuler Patient Status:  Inpatient    1970 MRN [id number]   Location [facility] 4W/SW/SE Attending Clarisa Gil MD   Hosp Day # 3 PCP MD Devante De Los Santos INDICATIONS: Patient presents with increased weakness. TECHNIQUE: CT images were obtained without contrast material.  Automated exposure control for dose reduction was used.    FINDINGS:  CSF SPACES: There is posterior displacement of the anterior horn of 12 mm soft tissue focus along the medial aspect of the left globe posterior chamber. OTHER: Negative. CONCLUSION:  1. There are lesions within the left frontal lobe and in the emily/midbrain, suspicious for neoplasm.   Multiplicity of foci favors metast lesion in the left frontal lobe. The ventricles are otherwise normal in size and configuration. CEREBRUM: In the medial aspect of the left frontal lobe is a 19 x 16 x 14 mm (AP x trans x craniocaudal) soft tissue focus with a surrounding rind of edema.   Yonas Copeland foci have edema surrounding them, and the edema within the emily/midbrain extends into the right cerebral peduncle and thalamus. The focus in the emily/mid brain also demonstrates peripheral calcification or peripheral bands of hemorrhage.  2.  2 sub cm size parameters were utilized for visualization of suspected pathology. Images were performed without and with gadolinium contrast.   FINDINGS:  COMMENT: Overall examination quality is compromised by patient motion artifact.  Several sequences were repeated, an emily and midbrain measuring 2.0 x 2.4 x 2.9 cm (series 23, image 43; series 24, image 13; series 25, image 14) demonstrating peripheral enhancement. Layering intralesional fluid is appreciated, likely representing internal hemorrhage.   Extensive edema is p involves the medial aspect of the flow. Additionally, the lesion exhibits mild intrinsic T1 hyperintensity, suggesting melanin content as can be seen in  the setting of melanoma.    Dictated by (CST): Stephany Olivera MD on 3/25/2017 at 8:26     Approved by eval for BRAF v6000 mutation and for potential further genomic eval.  Will need to ask radiology to see if they can obtain tissue from left supraclav node   --would not rec waiting for BRAF status,  Will start immune checkpoint inhibitor as soon kristy

## 2017-03-27 NOTE — PROGRESS NOTES
[facility] HOSP - [facility]    Hematology/Oncology   Progress Note    Darryn Guido Patient Status:  Inpatient    1970 MRN [id number]   Location [facility] 4W/SW/SE Attending Jairon Harris MD   Hosp Day # 4 PCP MD Amisha Carroll mediastinal, axillary and supraclav adenopathy c/w stage IV melanoma. --dexamethasone started  --rad onc consulted  Patient is much more somnolent today and biopsy has been placed on hold per family request.  She is not responding to commands currently.

## 2017-03-28 PROBLEM — C69.90 OCULAR MELANOMA (HCC): Status: ACTIVE | Noted: 2017-01-01

## 2017-03-28 NOTE — PROGRESS NOTES
[facility] HOSP - [facility]    Hematology/Oncology   Progress Note    Sunny Calles Patient Status:  Inpatient    1970 MRN [id number]   Location [facility] 4W/SW/SE Attending Cintia Martinez MD   Hosp Day # 5 PCP Nan Collins MD     Sharon Player

## 2017-03-28 NOTE — PLAN OF CARE
PAIN - ADULT    • Verbalizes/displays adequate comfort level or patient's stated pain goal Not Progressing        RESPIRATORY - ADULT    • Achieves optimal ventilation and oxygenation Not Progressing            Patient admitted to Hospice today.  She is roula

## 2017-03-28 NOTE — CONSULTS
I spoke with Jenna's sister, her ex- and Dr Richardine Schaumann. Nichol Lopez is still barely responsive.   Her family is choosing hospice and spoke with PCP    IP hospice consult placed    Sister requests SW to assist with referral about welfare of Jenna's children and

## 2017-03-28 NOTE — DISCHARGE PLANNING
MD order received regarding legal services for guardianship of her young children. SW provided information on [employer]. [facility] been [facility].       Pedro Carrillo [facility] ext [phone]

## 2017-03-29 NOTE — PLAN OF CARE
RESPIRATORY - ADULT    • Achieves optimal ventilation and oxygenation Not Progressing          PAIN - ADULT    • Verbalizes/displays adequate comfort level or patient's stated pain goal Progressing        -FAMILY AND FRIENDS AT BEDSIDE, FAMILY USING FAMILY

## 2017-03-29 NOTE — PROGRESS NOTES
03/29/17 1503   Clinical Encounter Type   Visited With Patient   Routine Visit Follow-up   Continue Visiting Yes   Referral From Family   Referral To [address]   Family Participation i

## 2017-03-29 NOTE — TELEPHONE ENCOUNTER
Patient is unresponsive and currently receiving Dexotran 4mg q4 hours, she is going to change it to q12 hours, patient is not seizing or showing signs of brain edema, and starting to feel cool to touch. If you feel differently you can contact her.

## 2017-03-29 NOTE — PROGRESS NOTES
[place] [facility] - [facility]    Hematology/Oncology   Progress Note    Amelia Call Patient Status:  Inpatient    1970 MRN [id number]   Location [facility] 4W/SW/SE Attending Cosmo Jones MD   [address] Day # 1 PCP MD Cesilia Urena

## 2017-03-29 NOTE — PROGRESS NOTES
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 Currently under inpatient hospice care. Continue to titrate Morphine drip as appropriate. Continue EoL care. Will continue to follow.       Rose Castaneda

## 2017-03-29 NOTE — CM/SW NOTE
Met with family in order to perform initial assessment and provide support. Family wanted information re cremation services as well as attorneys in order to get guardianship of pts young children. Referred family to [employer].   Also pr

## 2017-03-29 NOTE — PROGRESS NOTES
03/28/17 1902   Clinical Encounter Type   Visited With Family   Routine Visit Introduction   Referral From Nurse  (Palliative Care)   Referral To    Family Spiritual Encounters   Family Coping Sadness; Anxiety   Family Participation in Care 4   F

## 2017-03-29 NOTE — HOSPICE RN NOTE
Daily Inpatient Hospice Visit-Day 2    Patient received in bed, obtunded with absent blink reflex, minimally responsive to verbal/tactile stimuli. Currently on 1mg/hr of morphine via continuous drip.  Decadron has now been adjusted to 4mg  2 doses/day via I

## 2017-03-29 NOTE — PROGRESS NOTES
[place] [facility] - [facility]    Progress Note    Hood Moment Patient Status:  Inpatient    1970 MRN [id number]   Location [facility] 4W/SW/SE Attending Baltazar Arriaga MD   Hosp Day # 0 PCP Mignon Gallego MD       Subjective:   Pink Hem

## 2017-03-30 NOTE — HOSPICE RN NOTE
Received pt laying in bed with slightly labored breathing and furrowed brow.   Pt pushes away and facial grimacing when being touched   99.3  82  22  142/74  86%  Open mouth breathing with use of accessory muscles and apnea, ABD soft distended, skin hot dry

## 2017-03-30 NOTE — H&P
[address] [facility] Patient Status:  Inpatient    1970 MRN [id number]   Location [facility] 4W/SW/SE Attending Jairon Harris MD   Hosp Day # 1 PCP Brisa Ellis MD     Date:  3/29/2017  Jak following week.  Per discussion with Neurology and Neurosurgery and given the presence of multiple brain metastasis no surgical intervention was possible at this time and Radiation Oncology consultation was requested with the intention to start palliative r Comment ex. melanoma R foot-stage 2    TUBAL LIGATION Bilateral 2003     Family History   Problem Relation Age of Onset   • Breast Cancer Maternal Grandmother 44   • Breast Cancer Paternal Aunt 44   • Colon Cancer Paternal Grandmother 56   • Diabetes Fathe 03/26/2017   K 4.2 03/26/2017    03/26/2017   CO2 23 03/26/2017   * 03/26/2017   CA 9.3 03/26/2017   ALB 3.4* 03/26/2017   ALKPHO 80 03/26/2017   BILT 0.4 03/26/2017   TP 6.8 03/26/2017   AST 18 03/26/2017   ALT 18 03/26/2017   PTT 30.9 03/23/ BONES: Scattered Schmorl's nodes throughout the spine. Mild degenerative endplate osteophyte formation throughout the spine. No suspicious bone lesion. OTHER: Negative.    Dictated by (CST): Carly Wilburn MD on 3/23/2017 at 20:41     Approved by (CST): Ernie ventricle. There is an ill-defined region of edema in the right thalamus which may be due to mass effect caused by a lesion within the upper emily/midbrain.   Scattered subcentimeter nodular foci are seen within the peripheral aspect of the right frontal lo brain with without contrast is recommended. Dictated by (CST): Adria Fabry, MD on 3/23/2017 at 19:50     Approved by (CST): Adria Fabry, MD on 3/23/2017 at 19:57    ADDENDUM:  Additional imaging finding:  There is a lenticular shaped 4 x 12 mm focus occup (series 9, image 22), suspicious for additional lesions however they are difficult to characterize as they do not have significant surrounding edema. No large hematoma. CEREBELLUM: No edema, hemorrhage, mass, acute infarction, or significant atrophy.   BRA suspicious for tumor.   Dictated by (CST): Stephanie Sage MD on 3/23/2017 at 20:54     Approved by (CST): Stephanie Sage MD on 3/23/2017 at 20:57          3/25/2017  PROCEDURE: CT BRAIN OR HEAD (CPT=70450)  COMPARISON: [facility] Spectrum5 edema associated with this finding is within the emily but also extends into the right cerebral peduncle/thalamus. CALVARIUM: No apparent fracture, mass, or other significant visible lesion.   SINUSES: Limited views demonstrate no significant mucosal thicken subarachnoid hemorrhage, or effacement of the basal cisterns is appreciated. There is no extra-axial fluid collection. CEREBRUM: In the anteroinferior medial left frontal lobe, in close proximity with the medial left orbital roof, there is a 1.4 x 1.4 x 1. on the fourth ventricle with partial effacement. CALVARIUM: There is no apparent fracture, mass, or other significant visible lesion. SINUSES: Limited views demonstrate mild mucosal thickening of the left maxillary sinus.  Bilateral ubaldo bullosa deformit 3/23/2017, 18:35. INDICATIONS: Intracranial mass lesions. TECHNIQUE: A variety of imaging planes and parameters were utilized for visualization of suspected pathology.   Images were performed without and with gadolinium contrast.   FINDINGS:  COMMENT: [place] hemorrhage, mass, acute infarction, or significant atrophy.   BRAINSTEM: There is an ovoid lesion involving the emily and midbrain measuring 2.0 x 2.4 x 2.9 cm (series 23, image 43; series 24, image 13; series 25, image 14) demonstrating peripheral enhanceme Only)(cpt=74177)    3/25/2017  PROCEDURE: CT ABDOMEN + PELVIS (CONTRAST ONLY) (GUN=70745)  COMPARISON: [facility], CT BRAIN OR HEAD (CPT=70450), 3/23/2017, 18:35. [facility], MRI BRAIN (W+WO) (GOB=25225), 3/24/2017, 11:02. 52 mm.  There is a 34 mm diameter complex cyst with possible layering debris or soft tissue component in the right ovary. Left ovary is unremarkable.  BONES:   10 mm triangular-shaped sclerotic focus in the proximal right femur, likely representing a bone

## 2017-03-30 NOTE — PLAN OF CARE
Pt non responsive, MS gtt @ 3mg/hr, pt appears comfortable, family at bedside, provided emotional support, will continue to monitor.

## 2017-03-30 NOTE — PROGRESS NOTES
[facility]    Hematology/Oncology   Progress Note    Taunya Paget Patient Status:  Inpatient    1970 MRN [id number]   Location [facility] 4W/SW/SE Attending Lyn Lake MD   [facility] Day # 2 PCP MD Lala Montes

## 2017-03-30 NOTE — DISCHARGE SUMMARY
[place] [facility] - [facility]    Discharge Summary    Jodie Macias Patient Status:  Inpatient    1970 MRN [id number]   Location [facility] 4W/SW/SE Attending No att. providers found   [facility] Day # 5 PCP Adan Aguero MD     Date of Admission: home with a Rx for Meclizine; however, since then she has been feeling dizzier, weaker, and per family members’ account experiencing some confusion and slurred speech.  While in the ER she underwent a head CT which was consistent with multiple brain metasta (very sleepy and minimally arousable) it was canceled pending any improvement on her condition.  On 3/28/17 after not noticing any improvement on her mental status and the nature of her clinical condition, family members opted for initiating hospice care se

## 2017-03-30 NOTE — PLAN OF CARE
PAIN - ADULT    • Verbalizes/displays adequate comfort level or patient's stated pain goal Progressing          Patient is resting comfortably following one increase in morphine gtt. Prn dose of ativan given. Family remains at bedside.

## 2017-03-31 NOTE — PLAN OF CARE
Pt  at [address]. Resusitation not attempted as pt was DNR.     Time of Death 0210    Family Notified yes  Name and Samuel Garner of [facility] [employer] Notified  Yes  (get Rep Name and Case Number) Shiraz Mcnally [id number]     contacted i

## 2017-04-01 NOTE — DISCHARGE SUMMARY
[place] [facility] - [facility]    Discharge Summary    Michael Carmona Patient Status:  Inpatient    1970 MRN [id number]   Location [facility] 4W/SW/SE Attending No att. providers found   [address] Day # 3 PCP Opal Gonzalez MD     Date of Admission: eye and that she needed further workup to determine if it was a primary tumor or a metastasis from somewhere else.  To this regard, she underwent a chest CT (findings suspicious for malignancy/metastasis) earlier on the day of admission and was scheduled to Condition:     There are no discharge medications for this patient. Discharge Diet: N/A    Discharge Activity: N/A    Follow up:        Follow up Labs: none        VIV NICHOLS  3/31/2017

## 2018-04-17 NOTE — HOSPICE RN NOTE
Hospice inpatient admission    Received approval from Melinda Flowers for hospice inpatient coverage. Writer met with patient family and POA to discuss hospice philosophy, goals, and proposed plan of care. Answered all questions and concerns during visit.   Patients Patient was last seen 3/13/2018 and does not have a follow up scheduled.

## (undated) NOTE — ED AVS SNAPSHOT
[facility] Emergency Department    [address]     [place] [address]    Phone:  [phone]    Fax:  [phone]           Heidy Hester   MRN: [id number]    Department:  [facility] Emergency Department   Date of Visit:  3/22/2 indicado, llame al encargado de seda al [phone]. It is our goal to assure that you are completely satisfied with every aspect of your visit today.   In an effort to constantly improve our service to you, we would appreciate any positive or negativ Any imaging studies and labs completed today can be reviewed in your MyChart account. You may have had testing done that requires us to contact you. Please make sure we have your correct phone number on file.       I certified that I have received a copy Sign up for [employer], your secure online medical record. "[employer] will allow you to access patient instructions from your recent visit,  view other health information, and more. To sign up or find more information, go to [email or web address] and cl

## (undated) NOTE — ED AVS SNAPSHOT
[facility] Emergency Department    [address]     [place] [address]    Phone:  [phone]    Fax:  [phone]           Loretta Pal   MRN: [id number]    Department:  [facility] Emergency Department   Date of Visit:  3/22/2 and Class Registration line at [phone] or find a doctor online by visiting [email or web address].    IF THERE IS ANY CHANGE OR WORSENING OF YOUR CONDITION, CALL YOUR PRIMARY CARE PHYSICIAN AT ONCE OR RETURN IMMEDIATELY TO [address] [place] [address].     If

## (undated) NOTE — MR AVS SNAPSHOT
Naheed Smith   3/24/2017 9:30 AM   Office Visit   MRN:  [id number]    Description:  Female : 1970   Provider:  Gui Diaz   Department:  [address] [place] [address]              Visit Summary      Allergies     No Know

## (undated) NOTE — MR AVS SNAPSHOT
[address] at [facility]  [address]  [facility], [address]               Thank you for choosing us for your health care visit with Siva Dee MD.  We are glad to serve you and happy to himanshu - promethazine-codeine 6.25-10 MG/5ML Syrp            MyChart     Call the [employer] for assistance with your inactive [employer] account    If you have questions, you can call [phone] to talk to our [facility] Staff.  Remember, MyChart is NOT to b

## (undated) NOTE — MR AVS SNAPSHOT
[address] at [facility]  [address]  [facility], [address] [facility] [address]                Thank you for choosing us for your health care visit with Adan Aguero MD.  We are glad to serve you and happy to himanshu have any questions related to insurance coverage. Thank you.          Reason for Today's Visit     Follow - Up           Medical Issues Discussed Today     Choroidal tumor, benign      Instructions and Information about Your Health      Computed Tomography · You will lie on a table. The table slides into the CT scanner. · The technologist will ask you to hold your breath for a few seconds during your scan. After your test  · You can go back to your normal diet and activities right away.  Any contrast will p Expires: 5/13/2017  4:22 PM    If you have questions, you can call [phone] to talk to our [facility] Staff. Remember, [employer] is NOT to be used for urgent needs. For medical emergencies, dial 911.            Visit [place]